# Patient Record
Sex: MALE | Race: WHITE
[De-identification: names, ages, dates, MRNs, and addresses within clinical notes are randomized per-mention and may not be internally consistent; named-entity substitution may affect disease eponyms.]

---

## 2021-02-12 ENCOUNTER — HOSPITAL ENCOUNTER (OUTPATIENT)
Dept: HOSPITAL 11 - JP.SDS | Age: 56
Discharge: HOME | End: 2021-02-12
Attending: SURGERY
Payer: COMMERCIAL

## 2021-02-12 VITALS — HEART RATE: 67 BPM | DIASTOLIC BLOOD PRESSURE: 64 MMHG | SYSTOLIC BLOOD PRESSURE: 109 MMHG

## 2021-02-12 DIAGNOSIS — Z79.899: ICD-10-CM

## 2021-02-12 DIAGNOSIS — Z12.11: Primary | ICD-10-CM

## 2021-02-12 DIAGNOSIS — K44.9: ICD-10-CM

## 2021-02-12 DIAGNOSIS — Z80.0: ICD-10-CM

## 2021-02-12 DIAGNOSIS — K20.0: ICD-10-CM

## 2021-02-12 DIAGNOSIS — K64.9: ICD-10-CM

## 2021-02-12 PROCEDURE — 88305 TISSUE EXAM BY PATHOLOGIST: CPT

## 2021-02-12 PROCEDURE — 45378 DIAGNOSTIC COLONOSCOPY: CPT

## 2021-02-12 PROCEDURE — 43239 EGD BIOPSY SINGLE/MULTIPLE: CPT

## 2021-02-21 NOTE — OR
DATE OF PROCEDURE:  02/12/2021

 

SURGEON:  Preet Richards MD

 

PREOPERATIVE DIAGNOSES:

1. Intermittent dysphagia referable to the distal esophagus.

2. Family history of colon carcinoma.

 

POSTOPERATIVE DIAGNOSES:

1. Occasional dysphagia referable to distal esophagus associated with a 4 to 5 cm hiatal

    hernia with minimal inflammation and no obvious upward extension of columnar mucosa at

    the esophagogastric junction.

2. Normal colonoscopic examination.

 

OPERATIVE PROCEDURES:

1. Esophagogastroduodenoscopy with biopsies of esophagogastric junction.

2. Flexible colonoscopy.

 

ANESTHESIA:  IV sedation.

 

INDICATIONS FOR PROCEDURE:  This is a 55-year-old presenting with some occasional dysphagia

referable to distal esophagus.  He does not have overt heartburn symptoms and has not really

had any sense of food actually being caught.  There is simply just some occasional

discomfort and sense of dysphagia referable to the distal esophagus.  In addition to this,

the patient has a family history consistent with his mother having a colon carcinoma.  Plan

is to proceed with upper and lower endoscopy with biopsies and/or polypectomy as indicated.

Potential risks including bleeding and perforation were discussed and the patient wishes to

proceed.

 

DETAILS OF PROCEDURE:  The patient was taken to the operating room and placed in a left

lateral decubitus position.  IV sedation was administered after which the upper endoscope

was passed orally through the length of the esophagus, into the stomach with retroflexion

view of the fundus, and thereafter through the pyloric channel and into the proximal

duodenum.

 

Findings included normal hypopharynx, larynx, upper esophageal sphincter, and esophageal

body.  At the EG junction, the patient had a fairly large hiatal hernia measuring around 4

to 5 cm.  This was not associated with any stricturing.  There was only minimal gross

inflammation and no obvious upward extension of the columnar mucosa.  Remainder of the

gastric and duodenal exams were unremarkable.  At this point, multiple biopsies were

obtained from the esophagogastric junction, sent for histologic evaluation.  Minimal

bleeding from the biopsy sites was seen and the procedure then concluded.

 

Attention was taken to the colonoscopy.  The initial digital rectal exam was performed and

was unremarkable.  The colonoscope was then passed in the rectum with retroflexion revealing

uncomplicated hemorrhoidal columns.  The scope was eventually passed to the level of the

cecum.  The prep was quite good, only a small amount of liquid stool was present.  To that

level, no pathology was seen.  Specifically, there were no polyps, no areas of colitis or

diverticulosis or other signs of neoplasia.  The scope was then withdrawn, and the above

findings reconfirmed, and the procedure then concluded.

 

Recommendation would be to repeat the colonoscopy in 5 years given the patient's family

history of colon carcinoma.  At this point, he has very minimal symptoms in regard to the

reflux and I do not think it would be medically necessary to need to treat that.

Indications for treatment would be if today's biopsies show Bhatia's esophagus or he over

time develops increasing symptoms at which time he is instructed to contact Dr. Richards.

 

 

 

 

Preet Richards MD

DD:  02/20/2021 10:23:53

DT:  02/21/2021 11:30:47

Job #:  [id number]